# Patient Record
Sex: FEMALE | Race: OTHER | NOT HISPANIC OR LATINO
[De-identification: names, ages, dates, MRNs, and addresses within clinical notes are randomized per-mention and may not be internally consistent; named-entity substitution may affect disease eponyms.]

---

## 2023-06-22 PROBLEM — Z00.00 ENCOUNTER FOR PREVENTIVE HEALTH EXAMINATION: Status: ACTIVE | Noted: 2023-06-22

## 2023-06-30 ENCOUNTER — APPOINTMENT (OUTPATIENT)
Dept: GYNECOLOGIC ONCOLOGY | Facility: CLINIC | Age: 53
End: 2023-06-30
Payer: MEDICAID

## 2023-06-30 ENCOUNTER — NON-APPOINTMENT (OUTPATIENT)
Age: 53
End: 2023-06-30

## 2023-06-30 VITALS
TEMPERATURE: 96.9 F | HEART RATE: 79 BPM | HEIGHT: 67 IN | SYSTOLIC BLOOD PRESSURE: 135 MMHG | WEIGHT: 145 LBS | OXYGEN SATURATION: 98 % | DIASTOLIC BLOOD PRESSURE: 86 MMHG | BODY MASS INDEX: 22.76 KG/M2

## 2023-06-30 DIAGNOSIS — Z13.1 ENCOUNTER FOR SCREENING FOR DIABETES MELLITUS: ICD-10-CM

## 2023-06-30 DIAGNOSIS — Z01.818 ENCOUNTER FOR OTHER PREPROCEDURAL EXAMINATION: ICD-10-CM

## 2023-06-30 PROCEDURE — 99205 OFFICE O/P NEW HI 60 MIN: CPT

## 2023-07-05 LAB
ALBUMIN SERPL ELPH-MCNC: 4.8 G/DL
ALP BLD-CCNC: 88 U/L
ALT SERPL-CCNC: 40 U/L
ANION GAP SERPL CALC-SCNC: 15 MMOL/L
APTT BLD: 38.4 SEC
AST SERPL-CCNC: 49 U/L
BILIRUB SERPL-MCNC: 0.6 MG/DL
BUN SERPL-MCNC: 8 MG/DL
CALCIUM SERPL-MCNC: 10.3 MG/DL
CANCER AG19-9 SERPL-ACNC: 13 U/ML
CEA SERPL-MCNC: 1.9 NG/ML
CHLORIDE SERPL-SCNC: 100 MMOL/L
CO2 SERPL-SCNC: 24 MMOL/L
CREAT SERPL-MCNC: 0.78 MG/DL
EGFR: 91 ML/MIN/1.73M2
ESTIMATED AVERAGE GLUCOSE: 100 MG/DL
GLUCOSE SERPL-MCNC: 95 MG/DL
HBA1C MFR BLD HPLC: 5.1 %
INR PPP: 0.99 RATIO
LDH SERPL-CCNC: 233 U/L
MAGNESIUM SERPL-MCNC: 1.9 MG/DL
POTASSIUM SERPL-SCNC: 4.8 MMOL/L
PROT SERPL-MCNC: 7.8 G/DL
PT BLD: 11.5 SEC
SODIUM SERPL-SCNC: 140 MMOL/L

## 2023-07-05 NOTE — HISTORY OF PRESENT ILLNESS
[FreeTextEntry1] : Problem:\par 1)Pelvic Mass\par 2) h/o GONZÁLEZ/BSO 2' fibroids 2013\par \par Previous Therapies:\par 2) CT A/P 6/14/23\par     a) Multi cystic, thick walled pelvic mass 10.5 x 9.2 x 9.6cm \par \par 2) TVUS 6/21/23\par     a) R Adnexal mixed cystic and solid mass 9.7 x 7.9 x 8.7cm\par

## 2023-07-05 NOTE — CHIEF COMPLAINT
[FreeTextEntry1] : New Consult\par \par 53yo referred by Dr. Hubbard for Pelvic mass\par \par On 6/13 woke up with severe, sharp pain and couldn't sleep that night. Tylenol didn't help. Went to the ED the next day and [facility] CT revealed a mass.\par \par Endorses RLQ pain that radiates to the R flank/back. No pain currently, but it gets worse at night. Denies fevers, chills, chest pain, SOB, n/v, diarrhea, constipation, vaginal bleeding, abnormal vaginal discharge, dysuria. Endorses mild abdominal bloating. Endorses night sweats every night that lasted a few years after surgery, went away, and then came back about 1 year ago. Denies unintended weight loss.\par \par \par OBHX: SAb\par GYNHX: Fibroids. Gential herpes.\par \par PMHX: Denies\par SX: 2003 abdominal myomectomy. 2013 GONZÁLEZ/BSO. 2003 bilateral breast biopsy benign per patient.\par \par MED: Percocet 5/325 PRN (about once per day)\par ALL: Latex - hives. 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. Lives alone, but spends half time with mom in [place]\par \par FAM: Denies\par \par  \par Last Mammogram: 6/24 per patient - no results yet? 2018 L callback mammo BIRADS2.\par Last pap: 6/14 pap with Dr. Hubbard - no results yet?\par Last Colonoscopy: None

## 2023-07-05 NOTE — PHYSICAL EXAM
[Absent] : Adnexa(ae): Absent [Normal] : Anus and perineum: Normal sphincter tone, no masses, no prolapse. [Abnormal] : Recto-Vaginal Exam: Abnormal [de-identified] : mass appreciated in RLQ [de-identified] : Approximately 10 cm firm mostly solid mass arising within the right pelvis, anterior to the vaginal cuff.  [de-identified] : confirms, does not appear to involve rectosigmoid.

## 2023-07-05 NOTE — ASSESSMENT
[FreeTextEntry1] : I discussed with the patient with the aid of diagrams, reviewed the findings on history and physical examination, and reviewed the imaging studies in detail.  We reviewed the cystic and solid component of the mass, the CA-125, and other tumor markers. 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 \par \par Again, with the aid of diagrams the different surgical approaches discussed including minimally invasive and open approaches.\par \par At this point, I recommend diagnostic laparoscopy with likely exploratory laparotomy, and resection of pelvic mass. We also discussed the role of tumor debulking and possible bowel resection. \par \par Complications that include, but are not limited to: bleeding, infection, injury to other organs including bowel, bladder, ureters, blood vessels, nerves; infections, blood clots, lymphedema, pneumonia, wound complications and prolonged hospital stay have all been discussed with the patient. Whenever minimally invasive surgery is attempted, there is a chance of needing to convert to laparotomy. The risk of occult injury requiring additional surgery also discussed. I have also provided her with the diagrams.  \par \par \par [] Refer to Dr. Dong\par [] Bowel prep\par [] Medical clearance\par [] Pre-op labs\par [] CEA, , LDH\par [] Diagnostic lap, Exlap pelvic mass resection, possible bowel resection

## 2023-07-14 ENCOUNTER — NON-APPOINTMENT (OUTPATIENT)
Age: 53
End: 2023-07-14

## 2023-07-20 ENCOUNTER — APPOINTMENT (OUTPATIENT)
Age: 53
End: 2023-07-20
Payer: MEDICAID

## 2023-07-20 ENCOUNTER — RESULT REVIEW (OUTPATIENT)
Age: 53
End: 2023-07-20

## 2023-07-20 PROCEDURE — 45380 COLONOSCOPY AND BIOPSY: CPT

## 2023-07-20 PROCEDURE — 43239 EGD BIOPSY SINGLE/MULTIPLE: CPT

## 2023-07-20 RX ORDER — NEOMYCIN SULFATE 500 MG/1
500 TABLET ORAL
Qty: 6 | Refills: 0 | Status: ACTIVE | COMMUNITY
Start: 2023-06-30 | End: 1900-01-01

## 2023-07-20 RX ORDER — METRONIDAZOLE 500 MG/1
500 TABLET ORAL
Qty: 6 | Refills: 0 | Status: ACTIVE | COMMUNITY
Start: 2023-06-30 | End: 1900-01-01

## 2023-07-26 ENCOUNTER — TRANSCRIPTION ENCOUNTER (OUTPATIENT)
Age: 53
End: 2023-07-26

## 2023-07-26 NOTE — PATIENT PROFILE ADULT - FALL HARM RISK - RISK INTERVENTIONS

## 2023-07-27 ENCOUNTER — INPATIENT (INPATIENT)
Facility: HOSPITAL | Age: 53
LOS: 0 days | Discharge: HOME | DRG: 544 | End: 2023-07-28
Attending: OBSTETRICS & GYNECOLOGY | Admitting: OBSTETRICS & GYNECOLOGY
Payer: MEDICAID

## 2023-07-27 ENCOUNTER — TRANSCRIPTION ENCOUNTER (OUTPATIENT)
Age: 53
End: 2023-07-27

## 2023-07-27 ENCOUNTER — RESULT REVIEW (OUTPATIENT)
Age: 53
End: 2023-07-27

## 2023-07-27 ENCOUNTER — APPOINTMENT (OUTPATIENT)
Dept: GYNECOLOGIC ONCOLOGY | Facility: HOSPITAL | Age: 53
End: 2023-07-27

## 2023-07-27 VITALS
SYSTOLIC BLOOD PRESSURE: 148 MMHG | HEART RATE: 75 BPM | DIASTOLIC BLOOD PRESSURE: 80 MMHG | TEMPERATURE: 98 F | HEIGHT: 67 IN | RESPIRATION RATE: 16 BRPM | OXYGEN SATURATION: 98 % | WEIGHT: 147.93 LBS

## 2023-07-27 DIAGNOSIS — Z90.710 ACQUIRED ABSENCE OF BOTH CERVIX AND UTERUS: Chronic | ICD-10-CM

## 2023-07-27 DIAGNOSIS — Z91.040 LATEX ALLERGY STATUS: ICD-10-CM

## 2023-07-27 DIAGNOSIS — C49.5 MALIGNANT NEOPLASM OF CONNECTIVE AND SOFT TISSUE OF PELVIS: ICD-10-CM

## 2023-07-27 DIAGNOSIS — Z98.890 OTHER SPECIFIED POSTPROCEDURAL STATES: Chronic | ICD-10-CM

## 2023-07-27 DIAGNOSIS — Z88.8 ALLERGY STATUS TO OTHER DRUGS, MEDICAMENTS AND BIOLOGICAL SUBSTANCES: ICD-10-CM

## 2023-07-27 DIAGNOSIS — Z90.710 ACQUIRED ABSENCE OF BOTH CERVIX AND UTERUS: ICD-10-CM

## 2023-07-27 DIAGNOSIS — C76.3 MALIGNANT NEOPLASM OF PELVIS: ICD-10-CM

## 2023-07-27 DIAGNOSIS — R19.09 OTHER INTRA-ABDOMINAL AND PELVIC SWELLING, MASS AND LUMP: ICD-10-CM

## 2023-07-27 DIAGNOSIS — N85.8 OTHER SPECIFIED NONINFLAMMATORY DISORDERS OF UTERUS: ICD-10-CM

## 2023-07-27 PROBLEM — R19.00 INTRA-ABDOMINAL AND PELVIC SWELLING, MASS AND LUMP, UNSPECIFIED SITE: Chronic | Status: ACTIVE | Noted: 2023-07-26

## 2023-07-27 LAB
BLD GP AB SCN SERPL QL: NEGATIVE — SIGNIFICANT CHANGE UP
GLUCOSE BLDC GLUCOMTR-MCNC: 106 MG/DL — HIGH (ref 70–99)
RH IG SCN BLD-IMP: POSITIVE — SIGNIFICANT CHANGE UP

## 2023-07-27 PROCEDURE — 88305 TISSUE EXAM BY PATHOLOGIST: CPT | Mod: 26

## 2023-07-27 PROCEDURE — 88305 TISSUE EXAM BY PATHOLOGIST: CPT | Mod: 26,59

## 2023-07-27 PROCEDURE — 49329 UNLSTD LAPS PX ABD PERTM&OMN: CPT | Mod: 62

## 2023-07-27 PROCEDURE — 88112 CYTOPATH CELL ENHANCE TECH: CPT | Mod: 26

## 2023-07-27 PROCEDURE — 88342 IMHCHEM/IMCYTCHM 1ST ANTB: CPT | Mod: 26

## 2023-07-27 PROCEDURE — 88331 PATH CONSLTJ SURG 1 BLK 1SPC: CPT | Mod: 26

## 2023-07-27 PROCEDURE — 49320 DIAG LAPARO SEPARATE PROC: CPT

## 2023-07-27 PROCEDURE — 88360 TUMOR IMMUNOHISTOCHEM/MANUAL: CPT | Mod: 26,59

## 2023-07-27 PROCEDURE — 88341 IMHCHEM/IMCYTCHM EA ADD ANTB: CPT | Mod: 26

## 2023-07-27 DEVICE — SURGIFLO HEMOSTATIC MATRIX KIT: Type: IMPLANTABLE DEVICE | Status: FUNCTIONAL

## 2023-07-27 DEVICE — SURGICEL POWDER 3 GRAMS: Type: IMPLANTABLE DEVICE | Status: FUNCTIONAL

## 2023-07-27 RX ORDER — OXYCODONE HYDROCHLORIDE 5 MG/1
10 TABLET ORAL EVERY 4 HOURS
Refills: 0 | Status: DISCONTINUED | OUTPATIENT
Start: 2023-07-27 | End: 2023-07-28

## 2023-07-27 RX ORDER — HYDROMORPHONE HYDROCHLORIDE 2 MG/ML
0.5 INJECTION INTRAMUSCULAR; INTRAVENOUS; SUBCUTANEOUS
Refills: 0 | Status: DISCONTINUED | OUTPATIENT
Start: 2023-07-27 | End: 2023-07-28

## 2023-07-27 RX ORDER — SODIUM CHLORIDE 9 MG/ML
1000 INJECTION, SOLUTION INTRAVENOUS
Refills: 0 | Status: DISCONTINUED | OUTPATIENT
Start: 2023-07-27 | End: 2023-07-28

## 2023-07-27 RX ORDER — KETOROLAC TROMETHAMINE 30 MG/ML
30 SYRINGE (ML) INJECTION EVERY 6 HOURS
Refills: 0 | Status: COMPLETED | OUTPATIENT
Start: 2023-07-27 | End: 2023-07-28

## 2023-07-27 RX ORDER — PANTOPRAZOLE SODIUM 20 MG/1
20 TABLET, DELAYED RELEASE ORAL DAILY
Refills: 0 | Status: DISCONTINUED | OUTPATIENT
Start: 2023-07-27 | End: 2023-07-28

## 2023-07-27 RX ORDER — SIMETHICONE 80 MG/1
80 TABLET, CHEWABLE ORAL EVERY 6 HOURS
Refills: 0 | Status: DISCONTINUED | OUTPATIENT
Start: 2023-07-27 | End: 2023-07-28

## 2023-07-27 RX ORDER — CELECOXIB 200 MG/1
400 CAPSULE ORAL ONCE
Refills: 0 | Status: COMPLETED | OUTPATIENT
Start: 2023-07-27 | End: 2023-07-27

## 2023-07-27 RX ORDER — ACETAMINOPHEN 500 MG
1000 TABLET ORAL ONCE
Refills: 0 | Status: COMPLETED | OUTPATIENT
Start: 2023-07-27 | End: 2023-07-27

## 2023-07-27 RX ORDER — METOCLOPRAMIDE HCL 10 MG
10 TABLET ORAL EVERY 6 HOURS
Refills: 0 | Status: DISCONTINUED | OUTPATIENT
Start: 2023-07-27 | End: 2023-07-28

## 2023-07-27 RX ORDER — OXYCODONE HYDROCHLORIDE 5 MG/1
5 TABLET ORAL EVERY 4 HOURS
Refills: 0 | Status: DISCONTINUED | OUTPATIENT
Start: 2023-07-27 | End: 2023-07-28

## 2023-07-27 RX ORDER — METHADONE HYDROCHLORIDE 40 MG/1
10 TABLET ORAL ONCE
Refills: 0 | Status: DISCONTINUED | OUTPATIENT
Start: 2023-07-27 | End: 2023-07-27

## 2023-07-27 RX ORDER — ONDANSETRON 8 MG/1
8 TABLET, FILM COATED ORAL EVERY 6 HOURS
Refills: 0 | Status: DISCONTINUED | OUTPATIENT
Start: 2023-07-27 | End: 2023-07-28

## 2023-07-27 RX ORDER — HEPARIN SODIUM 5000 [USP'U]/ML
5000 INJECTION INTRAVENOUS; SUBCUTANEOUS ONCE
Refills: 0 | Status: COMPLETED | OUTPATIENT
Start: 2023-07-27 | End: 2023-07-27

## 2023-07-27 RX ORDER — ACETAMINOPHEN 500 MG
1000 TABLET ORAL EVERY 6 HOURS
Refills: 0 | Status: COMPLETED | OUTPATIENT
Start: 2023-07-27 | End: 2023-07-28

## 2023-07-27 RX ADMIN — Medication 1000 MILLIGRAM(S): at 23:52

## 2023-07-27 RX ADMIN — Medication 1000 MILLIGRAM(S): at 09:47

## 2023-07-27 RX ADMIN — Medication 1000 MILLIGRAM(S): at 18:36

## 2023-07-27 RX ADMIN — Medication 30 MILLIGRAM(S): at 21:13

## 2023-07-27 RX ADMIN — SIMETHICONE 80 MILLIGRAM(S): 80 TABLET, CHEWABLE ORAL at 23:52

## 2023-07-27 RX ADMIN — SIMETHICONE 80 MILLIGRAM(S): 80 TABLET, CHEWABLE ORAL at 18:36

## 2023-07-27 RX ADMIN — SODIUM CHLORIDE 125 MILLILITER(S): 9 INJECTION, SOLUTION INTRAVENOUS at 23:57

## 2023-07-27 RX ADMIN — HEPARIN SODIUM 5000 UNIT(S): 5000 INJECTION INTRAVENOUS; SUBCUTANEOUS at 09:47

## 2023-07-27 RX ADMIN — CELECOXIB 400 MILLIGRAM(S): 200 CAPSULE ORAL at 09:47

## 2023-07-27 NOTE — H&P ADULT - ASSESSMENT
52y  with PMH STUMP on prior myomectomy and subsequent benign prior GONZÁLEZ/BSO for scheduled diagnostic laparoscopy, possible ex-lap, pelvic mass excision, admitted for pain control and postoperative monitoring.     Plan   - NPO, IVF  - ERAS protocol  - Starting Hgb 13.1, Hct 73.7  - Starting Cr 0.78  - Consents signed      Barbie Duarte MD PGY4

## 2023-07-27 NOTE — BRIEF OPERATIVE NOTE - NSICDXBRIEFPROCEDURE_GEN_ALL_CORE_FT
PROCEDURES:  Diagnostic laparoscopy 27-Jul-2023 15:19:17  Jesse Zaldivar  Laparoscopic surgical removal of mass of pelvic cavity 27-Jul-2023 15:22:30  Jesse Zaldivar  
PROCEDURES:  Diagnostic laparoscopy 27-Jul-2023 15:19:17  Jesse Zaldivar  Laparoscopic surgical removal of mass of pelvic cavity 27-Jul-2023 15:22:30  Jesse Zaldivar

## 2023-07-27 NOTE — BRIEF OPERATIVE NOTE - OPERATION/FINDINGS
Diagnostic laparoscopy performed with entry gained into the abdomen via optivew at the left upper quadrant with a 5mm port. 9cm pelvic mass identified in the space of Retzius. 5mm ports placed at the umbilicus and suprapubically. Intra-operative urology consult performed. Mass excised using Ligasure and Harmonic with intra-operative rupture of the cyst (dark chocolate coloured fluid). 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 Bladder backfilled with methylene blue, no leakage seen. Umbilical port converted to 10mm port, mass placed in endocatch bag and removed. Surgiflow and surgicell powder used, haemostasis adequate. Urinary morrissey to be left in for 3 days.
Laparoscopic release of bladder from pelvic mass. Negative methylene blue leak test

## 2023-07-27 NOTE — BRIEF OPERATIVE NOTE - NSICDXBRIEFPREOP_GEN_ALL_CORE_FT
PRE-OP DIAGNOSIS:  Pelvic mass in female 27-Jul-2023 15:22:38  Jesse Zaldivar  
PRE-OP DIAGNOSIS:  Pelvic mass in female 27-Jul-2023 15:22:38  Jesse Zaldivar

## 2023-07-27 NOTE — PRE-ANESTHESIA EVALUATION ADULT - MALLAMPATI CLASS
DISPLAY PLAN FREE TEXT
Class I (easy) - visualization of the soft palate, fauces, uvula, and both anterior and posterior pillars

## 2023-07-27 NOTE — BRIEF OPERATIVE NOTE - ASSISTANT(S)
Jesse Zavaleta, Dr. Barbie Duarte
Jerson Barreto
Class III - visualization of the soft palate and the base of the uvula

## 2023-07-27 NOTE — H&P ADULT - HISTORY OF PRESENT ILLNESS
Patient is a 52y y.o.  presents for scheduled diagnostic laparoscopy, possible ex-lap, pelvic mass excision. Patient has a h/o prior hysterectomy. Denies chest pain, palpitations, SOB, n/v.    Problem:  1)Pelvic Mass  2) h/o GONZÁLEZ/BSO 2' fibroids     Previous Therapies:  2) CT A/P 23   a) Multi cystic, thick walled pelvic mass 10.5 x 9.2 x 9.6cm     2) TVUS 23   a) R Adnexal mixed cystic and solid mass 9.7 x 7.9 x 8.7cm    OBHX: SAb  GYNHX: Fibroids. Gential herpes.    PMHX: Denies  SX:  abdominal myomectomy (path showed STUMP).  GONZÁLEZ/BSO (path benign).  bilateral breast biopsy benign per patient.    MED: Percocet 5/325 PRN (about once per day)  ALL: Latex - hives. 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 NAD.  Resp: Breathing comfortably on RA.  Abd: Soft, non-tender, non-distended.  Ext: No calf tenderness

## 2023-07-27 NOTE — BRIEF OPERATIVE NOTE - NSICDXBRIEFPOSTOP_GEN_ALL_CORE_FT
POST-OP DIAGNOSIS:  Pelvic mass in female 27-Jul-2023 15:22:42  Jesse Zaldivar  
POST-OP DIAGNOSIS:  Pelvic mass in female 27-Jul-2023 15:22:42  Jesse Zaldivar

## 2023-07-28 ENCOUNTER — TRANSCRIPTION ENCOUNTER (OUTPATIENT)
Age: 53
End: 2023-07-28

## 2023-07-28 VITALS
DIASTOLIC BLOOD PRESSURE: 68 MMHG | RESPIRATION RATE: 18 BRPM | SYSTOLIC BLOOD PRESSURE: 114 MMHG | TEMPERATURE: 99 F | HEART RATE: 66 BPM | OXYGEN SATURATION: 99 %

## 2023-07-28 LAB
ANION GAP SERPL CALC-SCNC: 9 MMOL/L — SIGNIFICANT CHANGE UP (ref 5–17)
BASOPHILS # BLD AUTO: 0.03 K/UL — SIGNIFICANT CHANGE UP (ref 0–0.2)
BASOPHILS NFR BLD AUTO: 0.3 % — SIGNIFICANT CHANGE UP (ref 0–2)
BUN SERPL-MCNC: 4 MG/DL — LOW (ref 7–23)
CALCIUM SERPL-MCNC: 8.5 MG/DL — SIGNIFICANT CHANGE UP (ref 8.4–10.5)
CHLORIDE SERPL-SCNC: 102 MMOL/L — SIGNIFICANT CHANGE UP (ref 96–108)
CO2 SERPL-SCNC: 25 MMOL/L — SIGNIFICANT CHANGE UP (ref 22–31)
CREAT SERPL-MCNC: 0.69 MG/DL — SIGNIFICANT CHANGE UP (ref 0.5–1.3)
EGFR: 104 ML/MIN/1.73M2 — SIGNIFICANT CHANGE UP
EOSINOPHIL # BLD AUTO: 0 K/UL — SIGNIFICANT CHANGE UP (ref 0–0.5)
EOSINOPHIL NFR BLD AUTO: 0 % — SIGNIFICANT CHANGE UP (ref 0–6)
GLUCOSE SERPL-MCNC: 127 MG/DL — HIGH (ref 70–99)
HCT VFR BLD CALC: 33.5 % — LOW (ref 34.5–45)
HGB BLD-MCNC: 10.7 G/DL — LOW (ref 11.5–15.5)
IMM GRANULOCYTES NFR BLD AUTO: 0.5 % — SIGNIFICANT CHANGE UP (ref 0–0.9)
LYMPHOCYTES # BLD AUTO: 1.05 K/UL — SIGNIFICANT CHANGE UP (ref 1–3.3)
LYMPHOCYTES # BLD AUTO: 11.4 % — LOW (ref 13–44)
MAGNESIUM SERPL-MCNC: 1.4 MG/DL — LOW (ref 1.6–2.6)
MCHC RBC-ENTMCNC: 30.7 PG — SIGNIFICANT CHANGE UP (ref 27–34)
MCHC RBC-ENTMCNC: 31.9 GM/DL — LOW (ref 32–36)
MCV RBC AUTO: 96.3 FL — SIGNIFICANT CHANGE UP (ref 80–100)
MONOCYTES # BLD AUTO: 1.09 K/UL — HIGH (ref 0–0.9)
MONOCYTES NFR BLD AUTO: 11.8 % — SIGNIFICANT CHANGE UP (ref 2–14)
NEUTROPHILS # BLD AUTO: 7.01 K/UL — SIGNIFICANT CHANGE UP (ref 1.8–7.4)
NEUTROPHILS NFR BLD AUTO: 76 % — SIGNIFICANT CHANGE UP (ref 43–77)
NRBC # BLD: 0 /100 WBCS — SIGNIFICANT CHANGE UP (ref 0–0)
PHOSPHATE SERPL-MCNC: 3 MG/DL — SIGNIFICANT CHANGE UP (ref 2.5–4.5)
PLATELET # BLD AUTO: 284 K/UL — SIGNIFICANT CHANGE UP (ref 150–400)
POTASSIUM SERPL-MCNC: 3.9 MMOL/L — SIGNIFICANT CHANGE UP (ref 3.5–5.3)
POTASSIUM SERPL-SCNC: 3.9 MMOL/L — SIGNIFICANT CHANGE UP (ref 3.5–5.3)
RBC # BLD: 3.48 M/UL — LOW (ref 3.8–5.2)
RBC # FLD: 13.1 % — SIGNIFICANT CHANGE UP (ref 10.3–14.5)
SODIUM SERPL-SCNC: 136 MMOL/L — SIGNIFICANT CHANGE UP (ref 135–145)
WBC # BLD: 9.23 K/UL — SIGNIFICANT CHANGE UP (ref 3.8–10.5)
WBC # FLD AUTO: 9.23 K/UL — SIGNIFICANT CHANGE UP (ref 3.8–10.5)

## 2023-07-28 PROCEDURE — 88341 IMHCHEM/IMCYTCHM EA ADD ANTB: CPT

## 2023-07-28 PROCEDURE — 85025 COMPLETE CBC W/AUTO DIFF WBC: CPT

## 2023-07-28 PROCEDURE — C1889: CPT

## 2023-07-28 PROCEDURE — 36415 COLL VENOUS BLD VENIPUNCTURE: CPT

## 2023-07-28 PROCEDURE — 88112 CYTOPATH CELL ENHANCE TECH: CPT

## 2023-07-28 PROCEDURE — 88360 TUMOR IMMUNOHISTOCHEM/MANUAL: CPT

## 2023-07-28 PROCEDURE — 84100 ASSAY OF PHOSPHORUS: CPT

## 2023-07-28 PROCEDURE — 88305 TISSUE EXAM BY PATHOLOGIST: CPT

## 2023-07-28 PROCEDURE — 88331 PATH CONSLTJ SURG 1 BLK 1SPC: CPT

## 2023-07-28 PROCEDURE — 80048 BASIC METABOLIC PNL TOTAL CA: CPT

## 2023-07-28 PROCEDURE — 83735 ASSAY OF MAGNESIUM: CPT

## 2023-07-28 PROCEDURE — 82962 GLUCOSE BLOOD TEST: CPT

## 2023-07-28 PROCEDURE — 86901 BLOOD TYPING SEROLOGIC RH(D): CPT

## 2023-07-28 PROCEDURE — 86850 RBC ANTIBODY SCREEN: CPT

## 2023-07-28 PROCEDURE — 86900 BLOOD TYPING SEROLOGIC ABO: CPT

## 2023-07-28 RX ORDER — OXYCODONE HYDROCHLORIDE 5 MG/1
1 TABLET ORAL
Qty: 12 | Refills: 0
Start: 2023-07-28 | End: 2023-07-30

## 2023-07-28 RX ORDER — POTASSIUM CHLORIDE 20 MEQ
20 PACKET (EA) ORAL
Refills: 0 | Status: COMPLETED | OUTPATIENT
Start: 2023-07-28 | End: 2023-07-28

## 2023-07-28 RX ORDER — LIDOCAINE 4 G/100G
1 CREAM TOPICAL
Qty: 1 | Refills: 0
Start: 2023-07-28

## 2023-07-28 RX ORDER — ACETAMINOPHEN 500 MG
2 TABLET ORAL
Qty: 0 | Refills: 0 | DISCHARGE
Start: 2023-07-28

## 2023-07-28 RX ORDER — MAGNESIUM SULFATE 500 MG/ML
4 VIAL (ML) INJECTION ONCE
Refills: 0 | Status: COMPLETED | OUTPATIENT
Start: 2023-07-28 | End: 2023-07-28

## 2023-07-28 RX ORDER — ENOXAPARIN SODIUM 100 MG/ML
40 INJECTION SUBCUTANEOUS ONCE
Refills: 0 | Status: COMPLETED | OUTPATIENT
Start: 2023-07-28 | End: 2023-07-28

## 2023-07-28 RX ORDER — BENZOCAINE AND MENTHOL 5; 1 G/100ML; G/100ML
1 LIQUID ORAL
Refills: 0 | Status: DISCONTINUED | OUTPATIENT
Start: 2023-07-28 | End: 2023-07-28

## 2023-07-28 RX ADMIN — BENZOCAINE AND MENTHOL 1 LOZENGE: 5; 1 LIQUID ORAL at 09:19

## 2023-07-28 RX ADMIN — SODIUM CHLORIDE 125 MILLILITER(S): 9 INJECTION, SOLUTION INTRAVENOUS at 05:59

## 2023-07-28 RX ADMIN — Medication 25 GRAM(S): at 09:20

## 2023-07-28 RX ADMIN — Medication 1000 MILLIGRAM(S): at 14:26

## 2023-07-28 RX ADMIN — Medication 30 MILLIGRAM(S): at 09:19

## 2023-07-28 RX ADMIN — Medication 30 MILLIGRAM(S): at 02:55

## 2023-07-28 RX ADMIN — Medication 1000 MILLIGRAM(S): at 05:54

## 2023-07-28 RX ADMIN — ENOXAPARIN SODIUM 40 MILLIGRAM(S): 100 INJECTION SUBCUTANEOUS at 09:21

## 2023-07-28 NOTE — DISCHARGE NOTE PROVIDER - NSDCMRMEDTOKEN_GEN_ALL_CORE_FT
acetaminophen 500 mg oral tablet: 2 tab(s) orally every 6 hours  lidocaine 2.8% topical gel: Apply topically to affected area 4 times a day as needed for  mild pain Apply up to 4 times per day as needed for urethral irritation  oxyCODONE 5 mg oral tablet: 1 tab(s) orally every 6 hours as needed for  severe pain MDD: 4

## 2023-07-28 NOTE — DISCHARGE NOTE PROVIDER - NSDCFUSCHEDAPPT_GEN_ALL_CORE_FT
[facility]  GYNONC [address]  Scheduled Appointment: 08/03/2023    Laura Ellington  [facility]  GYNON[facility] [address]  Scheduled Appointment: 08/25/2023

## 2023-07-28 NOTE — DISCHARGE NOTE PROVIDER - NSDCCPTREATMENT_GEN_ALL_CORE_FT
PRINCIPAL PROCEDURE  Procedure: Laparoscopic surgical removal of mass of pelvic cavity  Findings and Treatment:

## 2023-07-28 NOTE — DISCHARGE NOTE PROVIDER - HOSPITAL COURSE
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 Negative methylene blue bladder leak test. Patient meeting post operative milestones and vitals are stable for discharge.

## 2023-07-28 NOTE — DISCHARGE NOTE PROVIDER - CARE PROVIDER_API CALL
Laura Ellington.  Gynecologic Oncology  [address]  [place] [address]  Phone: [phone]  Fax: [phone]  Follow Up Time:

## 2023-07-28 NOTE — DISCHARGE NOTE PROVIDER - CARE PROVIDERS DIRECT ADDRESSES
[email or web address]

## 2023-07-28 NOTE — PROGRESS NOTE ADULT - SUBJECTIVE AND OBJECTIVE BOX
GYN POC    Pt seen and examined at bedside. Pt complains of mild abdominal pain.   Pt denies any fever, chills, chest pain, SOB, nausea or vomiting. Tolerating PO. Plans to ambulate later.     T(F): 98.4 (07-27-23 @ 19:59), Max: 98.4 (07-27-23 @ 19:59)  HR: 77 (07-27-23 @ 19:59) (75 - 93)  BP: 118/73 (07-27-23 @ 19:59) (118/73 - 148/80)  RR: 17 (07-27-23 @ 19:59) (15 - 21)  SpO2: 95% (07-27-23 @ 19:59) (94% - 100%)  Wt(kg): --    07-27 @ 07:01  -  07-27 @ 21:28  --------------------------------------------------------  IN: 375 mL / OUT: 130 mL / NET: 245 mL        acetaminophen     Tablet .. 1000 milliGRAM(s) Oral every 6 hours  HYDROmorphone  Injectable 0.5 milliGRAM(s) IV Push every 15 minutes PRN Severe Pain (7 - 10)  ketorolac   Injectable 30 milliGRAM(s) IV Push every 6 hours  lactated ringers. 1000 milliLiter(s) IV Continuous <Continuous>  metoclopramide Injectable 10 milliGRAM(s) IV Push every 6 hours PRN Nausea and/or Vomiting  ondansetron Injectable 8 milliGRAM(s) IV Push every 6 hours PRN Nausea and/or Vomiting  oxyCODONE    IR 10 milliGRAM(s) Oral every 4 hours PRN Severe Pain (7 - 10)  oxyCODONE    IR 5 milliGRAM(s) Oral every 4 hours PRN Moderate Pain (4 - 6)  pantoprazole  Injectable 20 milliGRAM(s) IV Push daily  simethicone 80 milliGRAM(s) Chew every 6 hours      Physical exam:  Constitutional: NAD  Pulmonary: clear to auscultation bilaterally  Cardiovascular: regular rate and rhythm  Abdomen: incision site clean, dry and intact. Soft, mildly tender, nondistended  Extremities: no lower extremity edema, or calf tenderness. SCDs in place    
  SUBJECTIVE: Patient seen and examined at the bedside. Reports post surgical pain. No flank pain, n/v.    enoxaparin Injectable 40 milliGRAM(s) SubCutaneous once      Vital Signs Last 24 Hrs  T(C): 37.4 (28 Jul 2023 05:22), Max: 37.4 (28 Jul 2023 05:22)  T(F): 99.3 (28 Jul 2023 05:22), Max: 99.3 (28 Jul 2023 05:22)  HR: 61 (28 Jul 2023 05:22) (61 - 93)  BP: 120/78 (28 Jul 2023 05:22) (118/73 - 148/80)  BP(mean): 101 (27 Jul 2023 17:35) (95 - 103)  RR: 17 (28 Jul 2023 05:22) (15 - 21)  SpO2: 97% (28 Jul 2023 05:22) (94% - 100%)    Parameters below as of 28 Jul 2023 05:22  Patient On (Oxygen Delivery Method): room air      I&O's Detail    27 Jul 2023 07:01  -  28 Jul 2023 07:00  --------------------------------------------------------  IN:    Lactated Ringers: 1375 mL  Total IN: 1375 mL    OUT:    Indwelling Catheter - Urethral (mL): 2730 mL  Total OUT: 2730 mL    Total NET: -1355 mL          Physical Exam:  General: No acute distress, sitting comfortably in bed  Pulm: Nonlabored breathing, no respiratory distress  Abd: soft, minimally-tender, mildly-distended, incisions clean/dry/intact.   : morrissey draining clear yellow urine. No CVAT  Extrem: warm and well perfused, no edema, SCDs in place    LABS:                        10.7   9.23  )-----------( 284      ( 28 Jul 2023 05:30 )             33.5     07-28    136  |  102  |  4<L>  ----------------------------<  127<H>  3.9   |  25  |  0.69    Ca    8.5      28 Jul 2023 05:30  Phos  3.0     07-28  Mg     1.4     07-28        Urinalysis Basic - ( 28 Jul 2023 05:30 )    Color: x / Appearance: x / SG: x / pH: x  Gluc: 127 mg/dL / Ketone: x  / Bili: x / Urobili: x   Blood: x / Protein: x / Nitrite: x   Leuk Esterase: x / RBC: x / WBC x   Sq Epi: x / Non Sq Epi: x / Bacteria: x        RADIOLOGY & ADDITIONAL STUDIES:  
Patient evaluated at bedside. No acute events overnight. Patient denies chest pain, SOB, nausea, vomiting. Patient is ambulating independently, voiding spontaneously, and tolerating a regular diet. Not yet passing flatus.      T(C): 37.4 (07-28-23 @ 05:22), Max: 37.4 (07-28-23 @ 05:22)  HR: 61 (07-28-23 @ 05:22) (61 - 93)  BP: 120/78 (07-28-23 @ 05:22) (118/73 - 148/80)  RR: 17 (07-28-23 @ 05:22) (15 - 21)  SpO2: 97% (07-28-23 @ 05:22) (94% - 100%)  Wt(kg): --    Gen: Well-appearing. NAD.  Resp: Breathing comfortably on RA.  Abd: Soft, appropriately TTP post-op, non-distended, +BS  : [place] to gravity draining clear urine  Ext: No calf tenderness        07-27 @ 07:01  -  07-28 @ 07:00  --------------------------------------------------------  IN: 1375 mL / OUT: 2480 mL / NET: -1105 mL            acetaminophen     Tablet .. 1000 milliGRAM(s) Oral every 6 hours  HYDROmorphone  Injectable 0.5 milliGRAM(s) IV Push every 15 minutes PRN  ketorolac   Injectable 30 milliGRAM(s) IV Push every 6 hours  lactated ringers. 1000 milliLiter(s) IV Continuous <Continuous>  metoclopramide Injectable 10 milliGRAM(s) IV Push every 6 hours PRN  ondansetron Injectable 8 milliGRAM(s) IV Push every 6 hours PRN  oxyCODONE    IR 5 milliGRAM(s) Oral every 4 hours PRN  oxyCODONE    IR 10 milliGRAM(s) Oral every 4 hours PRN  pantoprazole  Injectable 20 milliGRAM(s) IV Push daily  simethicone 80 milliGRAM(s) Chew every 6 hours

## 2023-07-28 NOTE — DISCHARGE NOTE NURSING/CASE MANAGEMENT/SOCIAL WORK - PATIENT PORTAL LINK FT
You can access the FollowMyHealth Patient Portal offered by [facility] by registering at the following website: [email or web address]. By joining [employer]’s FollowMyHealth portal, you will also be able to view your health information using other applications (apps) compatible with our system.

## 2023-07-28 NOTE — PROGRESS NOTE ADULT - ASSESSMENT
52y y.o. F POD#1 s/p l/s pelvic mass excision with close bladder dissection and negative methylene blue leak test admitted for pain control and postoperative monitoring. No CVAT. Urine clear. Cr 0.69    -morrissey can be removed at primary teams discretion  -Rest of care per primary team
52y y.o. F POD#1 s/p l/s pelvic mass excision with close bladder dissection and negative methylene blue leak test admitted for pain control and postoperative monitoring.    Neuro: Tylenol/toradol, oxycodone PRN  CV: VSS. Post-op Hgb 10.7  Resp: On RA. Encourage ISS  GI/FEN: Low fiber diet as tolerated  : [place] until 7/31  ID: No concerns.  DVT ppx: SCDs, ambulate as tolerated. Lovenox this AM  Activity: Out of bed      Barbie Duarte MD PGY4
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 Negative methylene blue bladder leak test.    Neuro: Tylenol/toradol ATC, oxycodone PRN   CV: VSS.  Resp: On RA. Encourage ISS  GI/FEN: Low fiber diet. LR@125. Zofran/Reglan. Simethicone. Protonix.  : [place] in place  ID: Afebrile  DVT ppx: SCDs, ambulate as tolerated

## 2023-07-28 NOTE — DISCHARGE NOTE NURSING/CASE MANAGEMENT/SOCIAL WORK - NSDCPEFALRISK_GEN_ALL_CORE
For information on Fall & Injury Prevention, visit: [email or web address] OR  [email or web address] OR  https://www.cdc.gov/steadi/patient.html

## 2023-07-31 ENCOUNTER — APPOINTMENT (OUTPATIENT)
Dept: GYNECOLOGIC ONCOLOGY | Facility: CLINIC | Age: 53
End: 2023-07-31
Payer: MEDICAID

## 2023-07-31 VITALS
TEMPERATURE: 97.6 F | HEART RATE: 74 BPM | DIASTOLIC BLOOD PRESSURE: 73 MMHG | HEIGHT: 67 IN | WEIGHT: 148 LBS | OXYGEN SATURATION: 100 % | SYSTOLIC BLOOD PRESSURE: 131 MMHG | BODY MASS INDEX: 23.23 KG/M2

## 2023-07-31 LAB — NON-GYNECOLOGICAL CYTOLOGY STUDY: SIGNIFICANT CHANGE UP

## 2023-07-31 PROCEDURE — 99024 POSTOP FOLLOW-UP VISIT: CPT

## 2023-07-31 NOTE — ASSESSMENT
September 30, 2020        Flor Swanson  [address] [facility] [address]    To Whom It May Concern:    This is to certify Flor Swanson was evaluated on 09/30/20 and is unable to return to work.    Flor Swanson can return to work/school when the below CDC guidelines are met:    - At least 10 days have passed since symptom onset (9/29/2020) AND  - All patient's symptoms have resolved, AND  - Fevers have fully resolved for at least 24 hours without the use of fever-reducing medications    The Coronavirus is a rapidly evolving situation and recommendations are changing regularly to prevent spread of the disease and further loss of life.    Thank you for your understanding during these unusual times.       Electronically signed by:   TYRONE Baeza                 [id number] [facility] [id number]     [FreeTextEntry1] : S/P Laparoscopic pelvic mass excision Meeting post-op mile stones Passed TOV  [] discussed to alternate with tylenol/ibuprofen.  [] post-op precautions given. [] f/u pathology [] f/u with Dr. Ellington in 3 weeks

## 2023-07-31 NOTE — REASON FOR VISIT
[FreeTextEntry1] : Post-op and Trial of Void  51yo s/p Laparoscopic pelvic mass excision here for post-op note and trial of void. Pt reports pain is controlled with tylenol and occasional oxycodone. +BM  TRIAL OF VOID: 260ml sterile water into the bladder, removed the morrissey and pt voided 300ml.

## 2023-07-31 NOTE — CHIEF COMPLAINT
[FreeTextEntry1] : New Consult\par  \par  53yo referred by Dr. Hubbard for Pelvic mass\par  \par  On 6/13 woke up with severe, sharp pain and couldn't sleep that night. Tylenol didn't help. Went to the ED the next day and [facility]. CT revealed a mass.\par  \par  Endorses RLQ pain that radiates to the R flank/back. No pain currently, but it gets worse at night. Denies fevers, chills, chest pain, SOB, n/v, diarrhea, constipation, vaginal bleeding, abnormal vaginal discharge, dysuria. Endorses mild abdominal bloating. Endorses night sweats every night that lasted a few years after surgery, went away, and then came back about 1 year ago. Denies unintended weight loss.\par  \par  \par  OBHX: SAb\par  GYNHX: Fibroids. Gential herpes.\par  \par  PMHX: Denies\par  SX: 2003 abdominal myomectomy. 2013 GONZÁLEZ/BSO. 2003 bilateral breast biopsy benign per patient.\par  \par  MED: Percocet 5/325 PRN (about once per day)\par  ALL: Latex - hives. 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 2018 L callback mammo BIRADS2.\par  Last pap: 6/14 pap with Dr. Hubbard - no results yet?\par  Last Colonoscopy: None

## 2023-07-31 NOTE — HISTORY OF PRESENT ILLNESS
[FreeTextEntry1] : Problem: 1)Pelvic Mass 2) h/o GONZÁLEZ/BSO 2' fibroids 2013  Previous Therapies: 1) CT A/P 6/14/23     a) Multi cystic, thick walled pelvic mass 10.5 x 9.2 x 9.6cm   2) TVUS 6/21/23     a) R Adnexal mixed cystic and solid mass 9.7 x 7.9 x 8.7cm  3) S/P Laparoscopic pelvic mass excision 7/27/23     a)

## 2023-07-31 NOTE — PHYSICAL EXAM
[Normal] : No focal neurologic defects observed [de-identified] : incisions c/d/i. soft, NT/ND [Fully active, able to carry on all pre-disease performance without restriction] : Status 0 - Fully active, able to carry on all pre-disease performance without restriction

## 2023-08-01 ENCOUNTER — NON-APPOINTMENT (OUTPATIENT)
Age: 53
End: 2023-08-01

## 2023-08-04 ENCOUNTER — TRANSCRIPTION ENCOUNTER (OUTPATIENT)
Age: 53
End: 2023-08-04

## 2023-08-17 LAB — SURGICAL PATHOLOGY STUDY: SIGNIFICANT CHANGE UP

## 2023-08-18 ENCOUNTER — NON-APPOINTMENT (OUTPATIENT)
Age: 53
End: 2023-08-18

## 2023-08-25 ENCOUNTER — APPOINTMENT (OUTPATIENT)
Dept: GYNECOLOGIC ONCOLOGY | Facility: CLINIC | Age: 53
End: 2023-08-25
Payer: MEDICAID

## 2023-08-25 VITALS
BODY MASS INDEX: 23.23 KG/M2 | TEMPERATURE: 96.9 F | HEIGHT: 67 IN | DIASTOLIC BLOOD PRESSURE: 80 MMHG | OXYGEN SATURATION: 97 % | HEART RATE: 94 BPM | WEIGHT: 148 LBS | SYSTOLIC BLOOD PRESSURE: 128 MMHG

## 2023-08-25 DIAGNOSIS — C49.9 MALIGNANT NEOPLASM OF CONNECTIVE AND SOFT TISSUE, UNSPECIFIED: ICD-10-CM

## 2023-08-25 DIAGNOSIS — R19.00 INTRA-ABDOMINAL AND PELVIC SWELLING, MASS AND LUMP, UNSPECIFIED SITE: ICD-10-CM

## 2023-08-25 PROCEDURE — 99214 OFFICE O/P EST MOD 30 MIN: CPT | Mod: 24

## 2023-08-25 NOTE — PHYSICAL EXAM
[Absent] : Adnexa(ae): Absent [Normal] : Anus and perineum: Normal sphincter tone, no masses, no prolapse. [de-identified] : Incision c/d/i

## 2023-08-25 NOTE — REASON FOR VISIT
[FreeTextEntry1] : 1 month post-op  51yo s/p Laparoscopic pelvic mass excision here for 1 month post-op visit.   Pathology report discussed in detail. I explained that while our suspicion is that this is most likely a progression of her atypical fibroid from 2003, we are not able to review those slides and therefore it is difficult for pathology to comment on this. Management of uterine LMS discussed in detail. Chemotherapy is typically the mainstay of treatment. Radiotherapy is sometimes considered for local control GIven the complexity of her presentation; I recommend that she see a Sarcoma specialist at [facility].

## 2023-08-25 NOTE — ASSESSMENT
[FreeTextEntry1] : Appropriate 1 month recovery.  Information for Dr. Rishi Mcgregor given to the patient. I encouraged her to let us know if she has trouble getting an appointment.   Will request CXR. CT ab/pelvis with no evidence of metastatic disease   [] CXR

## 2024-03-01 ENCOUNTER — NON-APPOINTMENT (OUTPATIENT)
Age: 54
End: 2024-03-01

## 2024-06-19 ENCOUNTER — NON-APPOINTMENT (OUTPATIENT)
Age: 54
End: 2024-06-19

## 2024-09-16 ENCOUNTER — APPOINTMENT (OUTPATIENT)
Dept: CARDIOLOGY | Facility: CLINIC | Age: 54
End: 2024-09-16

## (undated) DEVICE — TROCAR COVIDIEN BLUNT TIP HASSAN 10MM

## (undated) DEVICE — Device

## (undated) DEVICE — FOLEY TRAY 16FR 5CC LF UMETER CLOSED

## (undated) DEVICE — DRAPE LEGGINGS XL

## (undated) DEVICE — DRSG DERMABOND 0.7ML

## (undated) DEVICE — PACK EXPLORATORY LAPAROTOMY

## (undated) DEVICE — APPLICATOR ENDOSCOPIC FOR SUGIFLO

## (undated) DEVICE — VENODYNE/SCD SLEEVE CALF MEDIUM

## (undated) DEVICE — SUT VICRYL 2-0 18" CT-1 UNDYED (POP-OFF)

## (undated) DEVICE — TROCAR ETHICON ENDOPATH XCEL UNIVERSAL SLEEVE WITH OPTIVIEW 5MM X 100MM

## (undated) DEVICE — TROCAR ETHICON ENDOPATH XCEL BLADELESS WITH OPTIVIEW 5MM X 100MM

## (undated) DEVICE — SUT PDS II 0 18" ENDOLOOP LIGATURE

## (undated) DEVICE — POSITIONER FOAM EGG CRATE ULNAR 2PCS (PINK)

## (undated) DEVICE — SOL IRR BAG NS 0.9% 3000ML

## (undated) DEVICE — SHEARS HARMONIC ACE 5MM X 36CM CURVED TIP

## (undated) DEVICE — ENDOCATCH 10MM SPECIMEN POUCH

## (undated) DEVICE — APPLICATOR SURGICEL LAP TROCAR POINT 2.5MM X 150MM

## (undated) DEVICE — PACK PERI GYN

## (undated) DEVICE — PACK GENERAL LAPAROSCOPY

## (undated) DEVICE — SUT VICRYL 0 18" TIES

## (undated) DEVICE — SUT VICRYL 2-0 18" TIES

## (undated) DEVICE — TIP METZENBAUM SCISSOR MONOPOLAR ENDOCUT (ORANGE)

## (undated) DEVICE — TROCAR COVIDIEN VERSAPORT BLADELESS OPTICAL 15MM STANDARD

## (undated) DEVICE — DRSG TELFA 3 X 8

## (undated) DEVICE — SUT VICRYL 0 18" CT-1 UNDYED (POP-OFF)

## (undated) DEVICE — SPONGE ENDO PEANUT 5MM

## (undated) DEVICE — TROCAR COVIDIEN VERSAPORT BLADELESS OPTICAL 5MM STANDARD

## (undated) DEVICE — SUT VICRYL 0 27" UR-6

## (undated) DEVICE — TROCAR COVIDIEN VERSAONE FIXATION CANNULA 5MM

## (undated) DEVICE — SUT MONOCRYL 4-0 27" PS-2 UNDYED

## (undated) DEVICE — TROCAR COVIDIEN VERSAPORT BLADELESS OPTICAL 12MM STANDARD

## (undated) DEVICE — PACK CYSTO

## (undated) DEVICE — GLV 6.5 PROTEXIS (WHITE)

## (undated) DEVICE — TROCAR COVIDIEN BLUNT TIP HASSAN 12MM

## (undated) DEVICE — PACK GENERAL CLOSING

## (undated) DEVICE — TUBING STRYKER PNEUMOCLEAR HIGH FLOW HEATED

## (undated) DEVICE — SUT SILK 0 18" TIES

## (undated) DEVICE — INSUFFLATION NDL COVIDIEN SURGINEEDLE VERESS 120MM

## (undated) DEVICE — NDL GRASPING DISP

## (undated) DEVICE — ELCTR BOVIE TIP BLADE VALLEYLAB 6.5"

## (undated) DEVICE — SUT VICRYL 0 54" TIES

## (undated) DEVICE — ENDOCATCH II 15MM

## (undated) DEVICE — ELCTR CORD FOOTSWITCH 1PLR LAPSCP 10FT

## (undated) DEVICE — SUT VICRYL 1 18" CT-1 UNDYED (POP-OFF)

## (undated) DEVICE — TUBING TUR 2 PRONG

## (undated) DEVICE — POSITIONER PINK PAD PIGAZZI SYSTEM XL W ARM PROTECTOR

## (undated) DEVICE — GLV 7 PROTEXIS (WHITE)

## (undated) DEVICE — LIGASURE MARYLAND 37CM

## (undated) DEVICE — CATH ANGIOCATH 12G

## (undated) DEVICE — SUT PDS II 3-0 27" SH UNDYED

## (undated) DEVICE — WARMING BLANKET UPPER ADULT

## (undated) DEVICE — SHEARS HARMONIC HD 1000I 5MM X 36CM CURVED TIP

## (undated) DEVICE — LIGASURE IMPACT

## (undated) DEVICE — TUBING SUCTION 20FT

## (undated) DEVICE — STAPLER SKIN INSORB

## (undated) DEVICE — ELCTR BOVIE PENCIL SMOKE EVACUATION

## (undated) DEVICE — PREP BETADINE SPONGE STICKS